# Patient Record
(demographics unavailable — no encounter records)

---

## 2024-12-16 NOTE — PHYSICAL EXAM
[Alert] : alert [Acute Distress] : no acute distress [Normocephalic] : normocephalic [Flat Open Anterior [place]] : flat open anterior fontanelle [Icteric sclera] : nonicteric sclera [PERRL] : PERRL [Red Reflex Bilateral] : red reflex bilateral [Normally Placed Ears] : normally placed ears [Auricles Well Formed] : auricles well formed [Clear Tympanic membranes] : clear tympanic membranes [Light reflex present] : light reflex present [Bony structures visible] : bony structures visible [Patent Auditory Canal] : patent auditory canal [Discharge] : no discharge [Nares Patent] : nares patent [Palate Intact] : palate intact [Uvula Midline] : uvula midline [Supple, full passive range of motion] : supple, full passive range of motion [Palpable Masses] : no palpable masses [Symmetric Chest Rise] : symmetric chest rise [Clear to Auscultation Bilaterally] : clear to auscultation bilaterally [Regular Rate and Rhythm] : regular rate and rhythm [S1, S2 present] : S1, S2 present [Murmurs] : no murmurs [+2 Femoral Pulses] : +2 femoral pulses [Soft] : soft [Tender] : nontender [Distended] : not distended [Bowel Sounds] : bowel sounds present [Umbilical Stump Dry, Clean, Intact] : umbilical stump dry, clean, intact [Hepatomegaly] : no hepatomegaly [Splenomegaly] : no splenomegaly [Normal external genitailia] : normal external genitalia [Central Urethral Opening] : central urethral opening [Testicles Descended Bilaterally] : testicles descended bilaterally [Patent] : patent [Normally Placed] : normally placed [No Abnormal Lymph Nodes Palpated] : no abnormal lymph nodes palpated [Vivas-Ortolani] : negative Vivas-Ortolani [Symmetric Flexed Extremities] : symmetric flexed extremities [Spinal Dimple] : no spinal dimple [Tuft of Hair] : no tuft of hair [Startle Reflex] : startle reflex present [Suck Reflex] : suck reflex present [Rooting] : rooting reflex present [Palmar Grasp] : palmar grasp present [Plantar Grasp] : plantar reflex present [Symmetric Daniel] : symmetric [place] [Jaundice] : not jaundice [Dermal Melanocytosis] : Dermal Melanocytosis [Erythema Toxicum] : erythema toxicum

## 2024-12-16 NOTE — DISCUSSION/SUMMARY
[Normal Growth] : growth [Normal Development] : developmental [No Elimination Concerns] : elimination [Continue Regimen] : feeding [No Skin Concerns] : skin [Normal Sleep Pattern] : sleep [None] : no known medical problems [Term Infant] : term infant [Anticipatory Guidance Given] : Anticipatory guidance addressed as per the history of present illness section [ Transition] :  transition [ Care] :  care [Nutritional Adequacy] : nutritional adequacy [Parental Well-Being] : parental well-being [Safety] : safety [Hepatitis B In Hospital] : Hepatitis B administered while in the hospital [No Vaccines] : no vaccines needed [No Medications] : ~He/She~ is not on any medications [Parent/Guardian] : Parent/Guardian

## 2024-12-16 NOTE — HISTORY OF PRESENT ILLNESS
[Born at ___ Wks Gestation] : The patient was born at [unfilled] weeks gestation [C/S Indication: ____] : ( [unfilled] ) [[place]] : [facility] [(1) _____] : [unfilled] [(5) _____] : [unfilled] [Length: _____] : length of [unfilled] [HC: _____] : head circumference of [unfilled] [Age: ___] : [unfilled] year old mother [G: ___] : G [unfilled] [P: ___] : P [unfilled] [Significant Hx: ____] : The mother's  medical history is significant for [unfilled] [Rubella (Immune)] : Rubella immune [None] : There are no risk factors [TcB: _____] : Transcutaneous Bilirubin [unfilled] mg/dL [Nuchal Cord] : nuchal cord [BW: _____] : weight of [unfilled] [DW: _____] : Discharge weight was [unfilled] [Yes] : Yes [Breast milk] : breast milk [Expressed Breast milk ___oz/feed] : [unfilled] oz of expressed breast milk per feed [Formula ___ oz/feed] : [unfilled] oz of formula per feed [Hours between feeds ___] : Child is fed every [unfilled] hours [Vitamins ___] : Patient takes [unfilled] vitamins daily [Normal] : Normal [___ voids per day] : [unfilled] voids per day [Frequency of stools: ___] : Frequency of stools: [unfilled]  stools [Yellow] : yellow [Seedy] : seedy [Loose] : loose consistency [In Bassinet/Crib] : sleeps in bassinet/crib [Pacifier] : Uses pacifier [No] : No cigarette smoke exposure [Water heater temperature set at <120 degrees F] : Water heater temperature set at <120 degrees F [Rear facing car seat in back seat] : Rear facing car seat in back seat [Carbon Monoxide Detectors] : Carbon monoxide detectors at home [Smoke Detectors] : Smoke detectors at home. [Hepatitis B Vaccine Given] : Hepatitis B vaccine given [NO] : No [RSV vaccine] : RSV vaccine received by mother at least 14 days prior to delivery [HepBsAG] : HepBsAg negative [HIV] : HIV negative [HepC] : Hepatitis C negative [GBS] : GBS negative [VDRL/RPR (Reactive)] : VDRL/RPR nonreactive [] : negative [FreeTextEntry9] : B + [FreeTextEntry8] : [id number] CCHD/Hearing passed [On back] : does not sleep on back [Co-sleeping] : no co-sleeping [Loose bedding, pillow, toys, and/or bumpers in crib] : no loose bedding, pillow, toys, and/or bumpers in crib [Exposure to electronic nicotine delivery system] : No exposure to electronic nicotine delivery system

## 2024-12-23 NOTE — HISTORY OF PRESENT ILLNESS
[Born at ___ Wks Gestation] : The patient was born at [unfilled] weeks gestation [C/S Indication: ____] : ( [unfilled] ) [[place]] : [facility] [(1) _____] : [unfilled] [(5) _____] : [unfilled] [Nuchal Cord] : nuchal cord [BW: _____] : weight of [unfilled] [Length: _____] : length of [unfilled] [HC: _____] : head circumference of [unfilled] [DW: _____] : Discharge weight was [unfilled] [Age: ___] : [unfilled] year old mother [G: ___] : G [unfilled] [P: ___] : P [unfilled] [Significant Hx: ____] : The mother's  medical history is significant for [unfilled] [RSV vaccine] : RSV vaccine received by mother at least 14 days prior to delivery [Rubella (Immune)] : Rubella immune [None] : There are no risk factors [TcB: _____] : Transcutaneous Bilirubin [unfilled] mg/dL [Yes] : Yes [Normal] : Normal [No] : No cigarette smoke exposure [Water heater temperature set at <120 degrees F] : Water heater temperature set at <120 degrees F [Rear facing car seat in back seat] : Rear facing car seat in back seat [Carbon Monoxide Detectors] : Carbon monoxide detectors at home [Smoke Detectors] : Smoke detectors at home. [Breast milk] : breast milk [Formula ___ oz/feed] : [unfilled] oz of formula per feed [Hours between feeds ___] : Child is fed every [unfilled] hours [Vitamins ___] : Patient takes [unfilled] vitamins daily [___ voids per day] : [unfilled] voids per day [Frequency of stools: ___] : Frequency of stools: [unfilled]  stools [Yellow] : yellow [Seedy] : seedy [Loose] : loose consistency [In Bassinet/Crib] : sleeps in bassinet/crib [On back] : sleeps on back [Pacifier] : Uses pacifier [Hepatitis B Vaccine Given] : Hepatitis B vaccine given [HepBsAG] : HepBsAg negative [HIV] : HIV negative [HepC] : Hepatitis C negative [GBS] : GBS negative [VDRL/RPR (Reactive)] : VDRL/RPR nonreactive [] : negative [FreeTextEntry9] : B + [FreeTextEntry8] : [id number] CCHD/Hearing passed [Co-sleeping] : no co-sleeping [Loose bedding, pillow, toys, and/or bumpers in crib] : no loose bedding, pillow, toys, and/or bumpers in crib [Exposure to electronic nicotine delivery system] : No exposure to electronic nicotine delivery system [FreeTextEntry7] : NBS negative

## 2025-01-14 NOTE — PHYSICAL EXAM
[Alert] : alert [Normocephalic] : normocephalic [Flat Open Anterior [place]] : flat open anterior fontanelle [PERRL] : PERRL [Red Reflex Bilateral] : red reflex bilateral [Normally Placed Ears] : normally placed ears [Auricles Well Formed] : auricles well formed [Clear Tympanic membranes] : clear tympanic membranes [Light reflex present] : light reflex present [Bony landmarks visible] : bony landmarks visible [Nares Patent] : nares patent [Palate Intact] : palate intact [Uvula Midline] : uvula midline [Supple, full passive range of motion] : supple, full passive range of motion [Symmetric Chest Rise] : symmetric chest rise [Clear to Auscultation Bilaterally] : clear to auscultation bilaterally [Regular Rate and Rhythm] : regular rate and rhythm [S1, S2 present] : S1, S2 present [+2 Femoral Pulses] : +2 femoral pulses [Soft] : soft [Bowel Sounds] : bowel sounds present [Normal external genitailia] : normal external genitalia [Central Urethral Opening] : central urethral opening [Testicles Descended Bilaterally] : testicles descended bilaterally [Normally Placed] : normally placed [No Abnormal Lymph Nodes Palpated] : no abnormal lymph nodes palpated [Symmetric Flexed Extremities] : symmetric flexed extremities [Startle Reflex] : startle reflex present [Suck Reflex] : suck reflex present [Rooting] : rooting reflex present [Palmar Grasp] : palmar grasp reflex present [Plantar Grasp] : plantar grasp reflex present [Symmetric Daniel] : symmetric [place] [Rash and/or lesion present] : rash and/or lesion present [Acute Distress] : no acute distress [Discharge] : no discharge [Palpable Masses] : no palpable masses [Murmurs] : no murmurs [Tender] : nontender [Distended] : not distended [Hepatomegaly] : no hepatomegaly [Splenomegaly] : no splenomegaly [Vivas-Ortolani] : negative Vivas-Ortolani [Spinal Dimple] : no spinal dimple [Tuft of Hair] : no tuft of hair [Jaundice] : no jaundice [de-identified] : 10 CALM some more than 5mm Left leg

## 2025-01-14 NOTE — HISTORY OF PRESENT ILLNESS
[Normal] : Normal [No] : No cigarette smoke exposure [Water heater temperature set at <120 degrees F] : Water heater temperature set at <120 degrees F [Rear facing car seat in back seat] : Rear facing car seat in back seat [Carbon Monoxide Detectors] : Carbon monoxide detectors at home [Smoke Detectors] : Smoke detectors at home. [Mother] : mother [Expressed Breast milk ___oz/feed] : [unfilled] oz of expressed breast milk per feed [Formula ___ oz/feed] : [unfilled] oz of formula per feed [Hours between feeds ___] : Child is fed every [unfilled] hours [___ Feeding per 24 hrs] : a  total of [unfilled] feedings in 24 hours [___ voids per day] : [unfilled] voids per day [Frequency of stools: ___] : Frequency of stools: [unfilled]  stools [Yellow] : yellow [Seedy] : seedy [Loose] : loose consistency [In Bassinet/Crib] : sleeps in bassinet/crib [On back] : sleeps on back [NO] : No [Co-sleeping] : no co-sleeping [Loose bedding, pillow, toys, and/or bumpers in crib] : no loose bedding, pillow, toys, and/or bumpers in crib [Pacifier use] : not using pacifier [Exposure to electronic nicotine delivery system] : No exposure to electronic nicotine delivery system [At risk for exposure to TB] : Not at risk for exposure to Tuberculosis  [FreeTextEntry7] : No hospitalization/Surgeries, Specialist visit. [de-identified] : dark spots on the body

## 2025-01-16 NOTE — HISTORY OF PRESENT ILLNESS
[FreeTextEntry1] : NPV: NF check [de-identified] : AURORA BURR is a 1 month old who is presenting for evaluation of:   1. Brown spots since birth, dad and dad's family has similar spots but no papular lesions, no known diagnoses of NF1, no known neurological or ocular problems in the family, baby developing normally, has not seen optho   Social hx: Here with mom   present for visit [id number]

## 2025-01-16 NOTE — HISTORY OF PRESENT ILLNESS
[FreeTextEntry1] : NPV: NF check [de-identified] : AURORA BURR is a 1 month old who is presenting for evaluation of:   1. Brown spots since birth, dad and dad's family has similar spots but no papular lesions, no known diagnoses of NF1, no known neurological or ocular problems in the family, baby developing normally, has not seen optho   Social hx: Here with mom   present for visit [id number]

## 2025-01-16 NOTE — ASSESSMENT
[Use of independent historian: [ enter independent historian's relationship to patient ] :____] : As the patient was unable to provide a complete and reliable history, I obtained clinical history from the patient's [unfilled] [External notes review: [ enter provider(s) name(s) ] :____] : As part of my evaluation, I have reviewed prior clinical note(s) from provider(s) outside of my group practice. The name(s) are: [unfilled] [FreeTextEntry1] : #Cafe au laits spots x9 - Agree with appointment with NF center (apt on 5/13/2025 per peds note)  - Currently waitlisted for genetics  - Will d/w Dr. Small  - Photos taken for clinical monitoring 01/16/2025

## 2025-01-16 NOTE — PHYSICAL EXAM
[Alert] : alert [Well Nourished] : well nourished [FreeTextEntry3] : 8 light brown macules on the trunk  1 light brown patch on the LLE

## 2025-01-27 NOTE — PLAN
[FreeTextEntry1] : [facility] genetic testing NF panel cheek swab obtained today; mother was informed that results will take 2-3 weeks; if he tests positive, neurology office will contact family and will offer parental testing. I explained mother that parental genetic testing is optional. If he tests positive, I recommend follow up in 6-8 weeks to discuss results further and review plan All questions answered, mother reports understanding and agrees with plan

## 2025-01-27 NOTE — REASON FOR VISIT
[Initial Consultation] : an initial consultation for [Mother] : mother [Medical Records] : medical records [Language Line ] : provided by [employer] [facility]   [Other: ____] : [unfilled] [Time Spent: ____ minutes] : Total time spent using  services: [unfilled] minutes. The patient's primary language is not English thus required  services. [Interpreters_IDNumber] : [id number] [Interpreters_FullName] : Elle [FreeTextEntry3] : 1:30-2:15 [TWNoteComboBox1] : Czech

## 2025-01-27 NOTE — ASSESSMENT
[FreeTextEntry1] : 47 day old boy with JOLYNN macules. Paternal family history of JOLYNN macules. On exam multiple JOLYNN macules no other NF stigmata, otherwise non focal exam.  I reviewed the diagnosis of NF1 with mother. I discussed that NF1 has a birth incidence of approximately 1 in 3000 individuals. Approximately half of NF1 cases result from a de jo pathogenic variant in the NF1 gene, the other half is inherited. NF1 is characterized by cafe-au-lait macules, freckling in the axillary or inguinal regions, multiple cutaneous neurofibromas, iris Lisch nodules (iris hamartomas), bone dysplasia and optic glioma. I explained the fact that these findings are time-dependent. One is diagnosed clinically with NF1 if 2 of these findings exist or if one of those exist and a parent has NF1. At this age most children with NF1 have only the CALS. Most affected individuals meet diagnostic criteria in childhood. The condition is associated with benign and malignant neoplasms. The clinical features of NF1 syndrome are highly variable, even within the same family. The course of the condition is variable and unpredictable. AURORA fulfils 1 of 2 required criteria for the diagnosis of NF1. He has > 6 JOLYNN macules that are > 0.5 cm in size. His father and other paternal family members have JOLYNN spots, however, no one has a known diagnosis of NF1. So it is suspicious that he has NF1 but clinical diagnosis can't be made at this time.  I briefly reviewed Dx of Legius syndrome with mother.   I reviewed mother genetic testing for NF1 and Legius syndrome. I advised mother that genetic testing is positive in 95% of NF1 cases, so negative result does not completely rule out NF. I explained mother potential results, normal, abnormal or VOUS. I explained that if he tests positive parents may be tested as well. Mother agrees. She signed informed consent written in [place].

## 2025-01-27 NOTE — BIRTH HISTORY
[At Term] : at term [ Section] : by  section [None] : there were no delivery complications [de-identified] : getational diabetes [de-identified] : failed induction that was done for gestational diabetes; as per records: APGAR scores at 1 minute and 5 minutes were 9 and 9 respectively.

## 2025-01-27 NOTE — PHYSICAL EXAM
[Well-appearing] : well-appearing [Normocephalic] : normocephalic [Anterior fontanel- Open] : anterior fontanel- open [Anterior fontanel- Soft] : anterior fontanel- soft [Anterior fontanel- Flat] : anterior fontanel- flat [No dysmorphic facial features] : no dysmorphic facial features [No ocular abnormalities] : no ocular abnormalities [Soft] : soft [Straight] : straight [No deformities] : no deformities [Alert] : alert [Regards] : regards [Smiling] : smiling [Cooing] : cooing [Pupils reactive to light] : pupils reactive to light [Turns to light] : turns to light [Tracks face, light or objects with full extraocular movements] : tracks face, light or objects with full extraocular movements [No facial asymmetry or weakness] : no facial asymmetry or weakness [No nystagmus] : no nystagmus [No fasciculations] : no fasciculations [Normal axial and appendicular muscle tone with symmetric limb movements] : normal axial and appendicular muscle tone with symmetric limb movements [2+ biceps] : 2+ biceps [Knee jerks] : knee jerks [No ankle clonus] : no ankle clonus [de-identified] : awake, alert, in NAD [de-identified] : JOLYNN macules: 1.5 cm mid abdomen, 2.5 cm mid abdomen, 0.5 cm left abdomen, 1 cm left abdomen, 0.5 cm pubis, 0.5 cm right ankle medial, 5.5 cm left LE, 1 cm right scapula, 1 cm left back, 1 cm left back, 1.5 cm left flank, 1 cm right buttock, 2 cm back of right LE, several additional JOLYNN macules < 0.5 cm (not measured); no axillary or inguinal freckling; no lumps or bumps

## 2025-01-27 NOTE — CONSULT LETTER
[Dear  ___] : Dear  [unfilled], [Consult Letter:] : I had the pleasure of evaluating your patient, [unfilled]. [Please see my note below.] : Please see my note below. [Consult Closing:] : Thank you very much for allowing me to participate in the care of this patient.  If you have any questions, please do not hesitate to contact me. [Sincerely,] : Sincerely, [FreeTextEntry3] : David Velazquez M.D Pediatric neurology attending Neurofibromatosis clinic Co-director [facility] Tel: [phone] Fax: [phone]

## 2025-01-27 NOTE — HISTORY OF PRESENT ILLNESS
[FreeTextEntry1] : 01/27/2025 FIRST VISIT ; AURORA is a 1 month old male, with mother Referred by Dr. Parkinson, dermatology  Mother reports AURORA has spots on the skin. Small spots were noted already at birth. The spots on the foot first noted on 1/14/25.  Father has similar spots. Parents thought AURORA has the same like father. Father's siblings have similar spots. Paternal GM has the spots too. Paternal sibling has spots as well. No family member was diagnosed with NF1. Father does not have lumps or bumps. No FHx of tumors. No FHx of developmental issues.  Mother states that Dx of NF1 was brought up by PMD and she read about it.

## 2025-03-10 NOTE — REASON FOR VISIT
If you are a smoker, it is important for your health to stop smoking. Please be aware that second hand smoke is also harmful. [Follow-Up Evaluation] : a follow-up evaluation for [Language Line ] : provided by [employer] [facility]   [Other: ____] : [unfilled] [Mother] : mother [Medical Records] : medical records [Time Spent: ____ minutes] : Total time spent using  services: [unfilled] minutes. The patient's primary language is not English thus required  services. [Interpreters_IDNumber] : [id number] [Interpreters_FullName] : Trey [FreeTextEntry3] : 10:24-10:51 [TWNoteComboBox1] : Lithuanian

## 2025-03-10 NOTE — ASSESSMENT
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 Parents tested negative. Mother does not have any concerns. On exam JOLYNN stigmata, otherwise non focal  I reviewed with mother Dx of NF1 with mother:  1. NF1 has a birth incidence of approximately 1 in 3000 individuals. Approximately half of NF1 cases result from a de jo pathogenic variant in the NF1 gene, the other half is inherited. I advised mother that in AURORA's case, given that parents tested negative, he had a de jo variant. However, when AURORA will be ready to have a family, AURORA has a 50% chance to transmit the condition to offspring in each pregnancy. I briefly reviewed PGD with mother. I also advised mother that as both parents tested negative, the risk of having another child with this condition is very small, close to zero, but not zero.  2. Reviewed NF1 markers: cafe-au-lait macules, freckling in the axillary or inguinal regions, multiple cutaneous neurofibromas, iris Lisch nodules (iris hamartomas), bone dysplasia and optic glioma. I explained the fact that these findings are time-dependent.  3. Discussed other manifestations of NF1 including macrocephaly, scoliosis, developmental delays, high blood pressure.  4. Reviewed risk of tumors in NF1. Brain tumors and optic nerve gliomas, are the most common tumors in children with NF1 aside from neurofibromas, are typically benign and reported in approximately 2-3% and 15% of affected individuals, respectively. The condition is also associated with other neoplasms.  5. Explained mother that the clinical features of NF1 syndrome are highly variable, even within the same family. The course of the condition is variable and unpredictable. 6. I discussed with mother obtaining Brain and Orbit MRI screening. Given that AURORA is not 3 months old at this time, given that he is doing well so far, in shared decision making we decided to obtain imaging in the near future but not today. 7. I advised AURORA needs to continue and see ophtho at least yearly, or more often than that as indicated 8. 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 Mother would like to get that done. Referral given 9. Mother states that PMD told her that this is a chronic condition; mother questions that. I explained mother that any genetic change / any gene mutation that occurs, such as in AURORA's case, cannot be corrected or cured; therefore, it will be carried life-long with potential complications that need to be monitored lifelong. Mother reports understanding 10. Printed information about NF1, in [place], by the CTF provided to mother

## 2025-03-10 NOTE — PLAN
[FreeTextEntry1] : Follow up in June 2025 (when 6 months old); will monitor for developmental progress All questions answered; mother reports understanding and agrees with plan

## 2025-03-10 NOTE — DEVELOPMENTAL MILESTONES
[Smiles spontaneously] : smiles spontaneously [Follows past midline] : follows past midline [Laughs] : laughs ["OOO/AAH"] : "oluciana/jesse" [Vocalizes] : vocalizes [Sit-head steady] : sit-head steady [Head up 90 degrees] : head up 90 degrees [Responds to affection] : responds to affection [Social smile] : social smile [Bears weight on legs] : does not bear weight on legs

## 2025-03-10 NOTE — HISTORY OF PRESENT ILLNESS
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 Egan, [facility], on 2/13/25; no findings; F/U 1 year ___________________  03/10/2025  follow up Above reviewed with mother Mother states she got all genetic test results. Mother reports AURORA is doing well and making developmental progress; he is making sounds and lifting head up Mother does not feel any lumps or bumps Mother questions Dx of Kellogg syndrome as reported on the genetic test

## 2025-03-10 NOTE — QUALITY MEASURES
[Scoliosis] : Scoliosis: Yes [Hypertension] : Hypertension: Yes [Ophthalmology] : Ophthalmology: Yes [Brain and Orbit MRI] : Brain and Orbit MRI: Yes

## 2025-03-10 NOTE — PHYSICAL EXAM
[Well-appearing] : well-appearing [Normocephalic] : normocephalic [Anterior fontanel- Open] : anterior fontanel- open [Anterior fontanel- Soft] : anterior fontanel- soft [Anterior fontanel- Flat] : anterior fontanel- flat [No dysmorphic facial features] : no dysmorphic facial features [No ocular abnormalities] : no ocular abnormalities [Soft] : soft [Straight] : straight [No deformities] : no deformities [Alert] : alert [Regards] : regards [Smiling] : smiling [Cooing] : cooing [Pupils reactive to light] : pupils reactive to light [Turns to light] : turns to light [Tracks face, light or objects with full extraocular movements] : tracks face, light or objects with full extraocular movements [No facial asymmetry or weakness] : no facial asymmetry or weakness [No nystagmus] : no nystagmus [Midline tongue] : midline tongue [No fasciculations] : no fasciculations [Normal axial and appendicular muscle tone with symmetric limb movements] : normal axial and appendicular muscle tone with symmetric limb movements [2+ biceps] : 2+ biceps [Knee jerks] : knee jerks [No ankle clonus] : no ankle clonus [de-identified] : awake, alert, in NAD [de-identified] : multiple JOLYNN macules; no axillary or inguinal freckling; no lumps or bumps [de-identified] : good head control with pull to sit [de-identified] : refusing to bear weight on LEs; crying when placed prone but lifts head up

## 2025-04-14 NOTE — PHYSICAL EXAM
[Alert] : alert [Normocephalic] : normocephalic [Flat Open Anterior [place]] : flat open anterior fontanelle [Red Reflex] : red reflex bilateral [PERRL] : PERRL [Normally Placed Ears] : normally placed ears [Auricles Well Formed] : auricles well formed [Clear Tympanic membranes] : clear tympanic membranes [Light reflex present] : light reflex present [Bony landmarks visible] : bony landmarks visible [Nares Patent] : nares patent [Palate Intact] : palate intact [Uvula Midline] : uvula midline [Symmetric Chest Rise] : symmetric chest rise [Clear to Auscultation Bilaterally] : clear to auscultation bilaterally [Regular Rate and Rhythm] : regular rate and rhythm [S1, S2 present] : S1, S2 present [+2 Femoral Pulses] : (+) 2 femoral pulses [Soft] : soft [Bowel Sounds] : bowel sounds present [Central Urethral Opening] : central urethral opening [Testicles Descended] : testicles descended bilaterally [Patent] : patent [Normally Placed] : normally placed [No Abnormal Lymph Nodes Palpated] : no abnormal lymph nodes palpated [Startle Reflex] : startle reflex present [Plantar Grasp] : plantar grasp reflex present [Symmetric Daniel] : symmetric daniel [Rash or Lesions] : rash and/or lesion present [Acute Distress] : no acute distress [Discharge] : no discharge [Palpable Masses] : no palpable masses [Murmurs] : no murmurs [Tender] : nontender [Distended] : nondistended [Hepatomegaly] : no hepatomegaly [Splenomegaly] : no splenomegaly [Vivas-Ortolani] : negative Vivas-Ortolani [Allis Sign] : negative Allis sign [Spinal Dimple] : no spinal dimple [Tuft of Hair] : no tuft of hair [de-identified] : Multiple CALM

## 2025-04-14 NOTE — HISTORY OF PRESENT ILLNESS
[Well-balanced] : well-balanced [Normal] : Normal [No] : No cigarette smoke exposure [Water heater temperature set at <120 degrees F] : Water heater temperature set at <120 degrees F [Rear facing car seat in back seat] : Rear facing car seat in back seat [Carbon Monoxide Detectors] : Carbon monoxide detectors at home [Smoke Detectors] : Smoke detectors at home. [NO] : No [Mother] : mother [Formula ___ oz/feed] : [unfilled] oz of formula per feed [Hours between feeds ___] : Child is fed every [unfilled] hours [___ voids per day] : [unfilled] voids per day [Frequency of stools: ___] : Frequency of stools: [unfilled]  stools [In Bassinet/Crib] : sleeps in bassinet/crib [On back] : sleeps on back [Sleeps 12-16 hours per 24 hours (including naps)] : sleeps 12-16 hours per 24 hours (including naps) [Pacifier use] : Pacifier use [Tummy time] : tummy time [Co-sleeping] : no co-sleeping [Loose bedding, pillow, toys, and/or bumpers in crib] : no loose bedding, pillow, toys, and/or bumpers in crib [Screen time only for video chatting] : screen time not just for video chatting [Exposure to electronic nicotine delivery system] : No exposure to electronic nicotine delivery system [FreeTextEntry7] : No hospitalization/Surgeries, Specialist visit. [de-identified] : None [FreeTextEntry1] : 4 m/o with NF1, confirmed genetically. 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 Mother would like to get that done. Referral given F/U ophtal Dr Egan 2/2025 wnl - f/u 1 year

## 2025-04-29 NOTE — CONSULT LETTER
[Today's Date] : [unfilled] [Name] : Name: [unfilled] [] : : ~~ [Today's Date:] : [unfilled] [Dear  ___:] : Dear Dr. [unfilled]: [Consult - Single Provider] : Thank you very much for allowing me to participate in the care of this patient. If you have any questions, please do not hesitate to contact me. [Sincerely,] : Sincerely, [DrJuvenal  ___] : Dr. PEREZ [FreeTextEntry4] : Dr. David Velazquez MD [FreeTextEntry5] : [address], [place] [address] [FreeTextEntry6] : [phone]

## 2025-04-29 NOTE — PHYSICAL EXAM
[General Appearance - Alert] : alert [General Appearance - In No Acute Distress] : in no acute distress [General Appearance - Well Nourished] : well nourished [General Appearance - Well-Appearing] : well appearing [Attitude Uncooperative] : cooperative [General Appearance - Well Developed] : playful [Facies] : the head and face were normal in appearance [Appearance Of Head] : the head was normocephalic [Other ___] : [unfilled] [Sclera] : the conjunctiva were normal [PERRL With Normal Accommodation] : the pupils were equal in size, round, and reactive to light [Outer Ear] : the ears and nose were normal in appearance [Examination Of The Oral Cavity] : mucous membranes were moist and pink [Oropharynx] : the oropharynx was normal [No Cough] : no cough [Auscultation Breath Sounds / Voice Sounds] : breath sounds clear to auscultation bilaterally [Respiration, Rhythm And Depth] : normal respiratory rhythm and effort [Normal Chest Appearance] : the chest was normal in appearance [Apical Impulse] : quiet precordium with normal apical impulse [Heart Rate And Rhythm] : normal heart rate and rhythm [Heart Sounds] : normal S1 and S2 [No Murmur] : no murmurs  [Heart Sounds Gallop] : no gallops [Heart Sounds Pericardial Friction Rub] : no pericardial rub [Edema] : no edema [Arterial Pulses] : normal upper and lower extremity pulses with no pulse delay [Heart Sounds Click] : no clicks [Bowel Sounds] : normal bowel sounds [Abdomen Soft] : soft [Nondistended] : nondistended [Abdomen Tenderness] : non-tender [] : no hepato-splenomegaly [Nail Clubbing] : no clubbing  or cyanosis of the fingers [Cervical Lymph Nodes Enlarged Anterior] : The anterior cervical nodes were normal [Cervical Lymph Nodes Enlarged Posterior] : The posterior cervical nodes were normal [Cafe-Au-Lait Spots (___cm)] : [unfilled] ~Ucm cafe-au-lait spots [Chest] : on the chest [Back] : on the back [Lesions On Shoulders] : on the left shoulder

## 2025-04-29 NOTE — CARDIOLOGY SUMMARY
[Today's Date] : [unfilled] [FreeTextEntry1] : Sinus rhythm, rate 140/min, QRS axis +173, [place] 0.12, QRS 0.07, QTc 0.43 seconds; right axis deviation. [FreeTextEntry2] : Summary:  1. {S,D,S\} Situs solitus, D-ventricular looping, normally related great arteries. 2. Patent foramen ovale with left to right shunt, normal variant. 3. Normal right ventricular morphology with qualitatively normal size and systolic function. 4. Normal left ventricular size, morphology and systolic function. 5. No pericardial effusion.

## 2025-04-29 NOTE — HISTORY OF PRESENT ILLNESS
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  He was born at [facility] at 39 weeks gestation by  due to failed induction with a birthweight of 8 pounds and 4 ounces.  Pregnancy was complicated by preeclampsia and gestational diabetes.  He was subsequently noted to have cafe au lait spots and was referred to pediatric neurology and genetics where the diagnosis was confirmed for type I neurofibromatosis.  He has had no cardiovascular complaints such as central cyanosis, chronic cough, excessive sweating, poor feeding, or syncope.  Mother states that there is some developmental delay and that he is not turning yet.  Father 36 has cafe au lait spots mother 31 has a history of preeclampsia and gestational diabetes.  Paternal grandmother and paternal sibling also have cafe au lait spots with no specific diagnosis to date.  The family history is otherwise unremarkable for congenital or acquired heart disease.  He is on no medications and has no known allergies.

## 2025-04-29 NOTE — REVIEW OF SYSTEMS
[___ Formula] : [unfilled] Formula  [___ ounces/feeding] : ~SADIA negron/feeding [___ Times/day] : [unfilled] times/day

## 2025-04-29 NOTE — REASON FOR VISIT
[Initial Consultation] : an initial consultation for [Mother] : mother [Language [facility] ] : provided by [employer]   [Time Spent: ____ minutes] : Total time spent using  services: [unfilled] minutes. The patient's primary language is not English thus required  services. [FreeTextEntry3] : hx: Neurofibromatosis, type 1 [Interpreters_IDNumber] : [id number] [Interpreters_FullName] : Linda [TWNoteComboBox1] : Peruvian

## 2025-04-29 NOTE — CLINICAL NARRATIVE
[Up to Date] : Up to Date [FreeTextEntry2] : Discharge weight: 12/13/2024 3677g Patients weight 4/29/25: 7835g  Patient is gaining 30.3g/day since discharge on 12/13/24.

## 2025-05-05 NOTE — PHYSICAL EXAM
[NL] : normotonic [de-identified] : Left ear- honey crusted lesion with purulent discharge form ear canal, multiple erythematous papules on the face

## 2025-05-05 NOTE — HISTORY OF PRESENT ILLNESS
[Derm Symptoms] : DERM SYMPTOMS [Rash] : rash [Face] : face [___ Day(s)] : [unfilled] day(s) [Constant] : constant [New Food] : no new food [New Clothing] : no new clothing [New Skin Products] : no new skin products [New Diapers] : no new diapers [Recent Antibiotic Use] : no recent antibiotic use [Hx of recent COVID-19 infection] : no history of recent COVID-19 infection [Sick Contacts: ___] : no sick contacts [Erythematous] : erythematous [With discharge] : with discharge [Spreading] : spreading [Fever] : no fever [Pruritus] : no pruritus [Discharge from affected areas] : discharge from affected areas [Bleeding from affected areas] : no bleeding from affected areas [URI Symptoms] : no URI symptoms [Lip Swelling] : no lip swelling [Vomiting] : no vomiting [Diarrhea] : no diarrhea [Reducted Appetite] : no reduced appetite [Stable] : stable

## 2025-07-14 NOTE — HISTORY OF PRESENT ILLNESS
[Preoperative Visit] : for a medical evaluation prior to surgery [Good] : Good [Runny Nose] : runny nose  [Fever] : no fever [Chills] : no chills [Earache] : no earache [Headache] : no headache [Sore Throat] : no sore throat [Cough] : no cough [Appetite] : no decrease in appetite [Nausea] : no nausea [Vomiting] : no vomiting [Abdominal Pain] : no abdominal pain [Diarrhea] : no diarrhea [Easy Bruising] : no easy bruising [Rash] : no rash [Dysuria] : no dysuria [Urinary Frequency] : no urinary frequency [Prior Anesthesia] : No prior anesthesia [Prev Anesthesia Reaction] : no previous anesthesia reaction [Diabetes] : no diabetes [Pulmonary Disease] : no pulmonary disease [Renal Disease] : no renal disease [GI Disease] : no gastrointestinal disease [Sleep Apnea] : no sleep apnea [Transfusion Reaction] : no transfusion reaction [Impaired Immunity] : no impaired immunity [Frequent use of NSAIDs] : no use of NSAIDs [Anesthesia Reaction] : no anesthesia reaction [Clotting Disorder] : no clotting disorder [Bleeding Disorder] : no bleeding disorder [Sudden Death] : no sudden death [FreeTextEntry1] : MRI under sedation [FreeTextEntry2] : 7/21/2025 [de-identified] : Neuro Dr Velazquez

## 2025-07-14 NOTE — PHYSICAL EXAM
[General Appearance - Alert] : alert [General Appearance - Well-Appearing] : well appearing [General Appearance - In No Acute Distress] : in no acute distress [Appearance Of Head] : the head was normocephalic [Evidence Of Head Injury] : threre was no evidence of injury [Fontanelles Flat] : the anterior fontanelle was soft and flat [Facies] : no facial abnormalities were observed [Outer Ear] : the ears and nose were normal in appearance [Examination Of The Oral Cavity] : mucous membranes were moist and pink [Normal Appearance] : was normal in appearance [Neck Supple] : was supple [Enlarged Diffusely] : was not enlarged [Respiration, Rhythm And Depth] : normal respiratory rhythm and effort [Auscultation Breath Sounds / Voice Sounds] : clear bilateral breath sounds [Heart Rate And Rhythm] : heart rate and rhythm were normal [Heart Sounds] : normal S1 and S2 [Murmurs] : no murmurs [Bowel Sounds] : normal bowel sounds [Abdomen Soft] : soft [Abdomen Tenderness] : non-tender [Abdominal Distention] : nondistended [] : no hepato-splenomegaly [Atraumatic] : the extremities were atraumatic [FROM Extremities] : there was normal movement of all extremities [Normal Joints] : there was no swelling or deformity of the joints [Normal Motor Tone] : the muscle tone was normal [Involuntary Movements] : no involuntary movements were seen [Motor Tone] : normal tone [Generalized Lymph Node Enlargement] : no lymphadenopathy [Abnormal Color] : normal color and pigmentation [Skin Lesions 1] : no skin lesions were observed [Skin Turgor Decreased] : normal skin turgor [Normal] : normal texture and mobility [Penis Abnormality] : the penis was normal [Scrotum] : the scrotum was normal [Testes Cryptorchism] : both testicles were descended [Testes Mass (___cm)] : there were no testicular masses